# Patient Record
Sex: FEMALE | Race: ASIAN | NOT HISPANIC OR LATINO | URBAN - METROPOLITAN AREA
[De-identification: names, ages, dates, MRNs, and addresses within clinical notes are randomized per-mention and may not be internally consistent; named-entity substitution may affect disease eponyms.]

---

## 2018-10-13 ENCOUNTER — EMERGENCY (EMERGENCY)
Facility: HOSPITAL | Age: 23
LOS: 1 days | Discharge: HOME | End: 2018-10-13
Attending: EMERGENCY MEDICINE | Admitting: EMERGENCY MEDICINE
Payer: COMMERCIAL

## 2018-10-13 VITALS
OXYGEN SATURATION: 98 % | TEMPERATURE: 98 F | SYSTOLIC BLOOD PRESSURE: 123 MMHG | HEART RATE: 86 BPM | DIASTOLIC BLOOD PRESSURE: 76 MMHG | RESPIRATION RATE: 20 BRPM
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
## 2018-10-13 DIAGNOSIS — M79.671 PAIN IN RIGHT FOOT: ICD-10-CM

## 2018-10-13 DIAGNOSIS — S91.331A PUNCTURE WOUND WITHOUT FOREIGN BODY, RIGHT FOOT, INITIAL ENCOUNTER: ICD-10-CM

## 2018-10-13 PROCEDURE — 73630 X-RAY EXAM OF FOOT: CPT | Mod: 26,RT

## 2018-10-13 PROCEDURE — 99284 EMERGENCY DEPT VISIT MOD MDM: CPT | Mod: 25

## 2018-10-13 PROCEDURE — 99053 MED SERV 10PM-8AM 24 HR FAC: CPT

## 2018-10-13 RX ORDER — CEPHALEXIN 500 MG
500 CAPSULE ORAL ONCE
Qty: 0 | Refills: 0 | Status: COMPLETED | OUTPATIENT
Start: 2018-10-13 | End: 2018-10-13

## 2018-10-13 RX ADMIN — Medication 500 MILLIGRAM(S): at 08:39

## 2018-10-13 NOTE — ED PROVIDER NOTE - DIAGNOSTIC INTERPRETATION
Interpreted by MD  foot_ x-ray, 3_ views  No fracture, no dislocation (joint spaces grossly normal), no Foreign Body noted, soft tissue normal. unable to place wet read on order due to radiology dept canceling original order.

## 2018-10-13 NOTE — ED ADULT NURSE NOTE - NSIMPLEMENTINTERV_GEN_ALL_ED
Implemented All Universal Safety Interventions:  [place] to call system. Call bell, personal items and telephone within reach. Instruct patient to call for assistance. Room bathroom lighting operational. Non-slip footwear when patient is off stretcher. Physically safe environment: no spills, clutter or unnecessary equipment. Stretcher in lowest position, wheels locked, appropriate side rails in place.

## 2018-10-13 NOTE — ED PROVIDER NOTE - OBJECTIVE STATEMENT
24 yo female with right foot laceration after stepping on piece of broken glass at a bar. last tetanus within 5 years. LMP 10 days ago.

## 2018-10-13 NOTE — ED PROVIDER NOTE - NSFOLLOWUPINSTRUCTIONS_ED_ALL_ED_FT
keep dressing on for 12 hours then soak off in warm water.  apply neosporin oitnment 3 times daily for 2 days.

## 2018-10-13 NOTE — ED ADULT TRIAGE NOTE - CHIEF COMPLAINT QUOTE
Patient states she stepped on a piece of glass around 0200 this morning, complaining of bleeding and pain to area on her right foot; bleeding has now subsided; up to date on tetanus shot

## 2022-11-02 ENCOUNTER — OFFICE (OUTPATIENT)
Dept: URBAN - METROPOLITAN AREA CLINIC 92 | Facility: CLINIC | Age: 27
Setting detail: OPHTHALMOLOGY
End: 2022-11-02
Payer: COMMERCIAL

## 2022-11-02 DIAGNOSIS — H10.45: ICD-10-CM

## 2022-11-02 DIAGNOSIS — H16.223: ICD-10-CM

## 2022-11-02 PROCEDURE — 99213 OFFICE O/P EST LOW 20 MIN: CPT | Performed by: OPHTHALMOLOGY

## 2022-11-02 ASSESSMENT — REFRACTION_CURRENTRX
OD_SPHERE: -4.00
OD_CYLINDER: +0.25
OS_CYLINDER: +0.25
OD_OVR_VA: 20/
OS_SPHERE: -3.50
OD_AXIS: 083
OS_AXIS: 081
OS_OVR_VA: 20/

## 2022-11-02 ASSESSMENT — REFRACTION_AUTOREFRACTION
OD_CYLINDER: +0.25
OD_SPHERE: -1.00
OS_SPHERE: -0.75
OS_AXIS: 106
OD_AXIS: 121
OS_CYLINDER: +0.50

## 2022-11-02 ASSESSMENT — KERATOMETRY
OS_AXISANGLE_DEGREES: 097
OD_K1POWER_DIOPTERS: 39.75
METHOD_AUTO_MANUAL: AUTO
OS_K1POWER_DIOPTERS: 40.25
OS_K2POWER_DIOPTERS: 41.75
OD_AXISANGLE_DEGREES: 082
OD_K2POWER_DIOPTERS: 40.75

## 2022-11-02 ASSESSMENT — LID EXAM ASSESSMENTS: OD_TRICHIASIS: T

## 2022-11-02 ASSESSMENT — SPHEQUIV_DERIVED
OD_SPHEQUIV: -0.875
OS_SPHEQUIV: -0.5

## 2022-11-02 ASSESSMENT — TEAR BREAK UP TIME (TBUT)
OD_TBUT: T
OS_TBUT: T

## 2022-11-02 ASSESSMENT — CONFRONTATIONAL VISUAL FIELD TEST (CVF)
OS_FINDINGS: FULL
OD_FINDINGS: FULL

## 2022-11-02 ASSESSMENT — AXIALLENGTH_DERIVED
OD_AL: 25.2333
OS_AL: 24.7601

## 2022-11-02 ASSESSMENT — VISUAL ACUITY
OS_BCVA: 20/20
OD_BCVA: 20/20-

## 2022-11-02 ASSESSMENT — CORNEAL TRAUMA - ABRASION: OS_ABRASION: ABSENT

## 2022-11-22 ENCOUNTER — OFFICE (OUTPATIENT)
Dept: URBAN - METROPOLITAN AREA CLINIC 28 | Facility: CLINIC | Age: 27
Setting detail: OPHTHALMOLOGY
End: 2022-11-22
Payer: COMMERCIAL

## 2022-11-22 DIAGNOSIS — H10.433: ICD-10-CM

## 2022-11-22 DIAGNOSIS — H16.223: ICD-10-CM

## 2022-11-22 PROCEDURE — 99213 OFFICE O/P EST LOW 20 MIN: CPT | Performed by: OPHTHALMOLOGY

## 2022-11-22 ASSESSMENT — TONOMETRY
OD_IOP_MMHG: 17
OS_IOP_MMHG: 17

## 2022-11-22 ASSESSMENT — TEAR BREAK UP TIME (TBUT)
OS_TBUT: T
OD_TBUT: T

## 2022-11-30 PROBLEM — H10.433 ALLERGIC CONJUNCTIVITIS; BOTH EYES: Status: ACTIVE | Noted: 2022-11-22

## 2022-11-30 ASSESSMENT — REFRACTION_AUTOREFRACTION
OS_CYLINDER: +0.50
OS_SPHERE: -0.75
OD_CYLINDER: +0.25
OD_AXIS: 121
OS_AXIS: 106
OD_SPHERE: -1.00

## 2022-11-30 ASSESSMENT — REFRACTION_CURRENTRX
OS_AXIS: 081
OS_SPHERE: -3.50
OD_AXIS: 083
OS_CYLINDER: +0.25
OD_SPHERE: -4.00
OD_CYLINDER: +0.25
OD_OVR_VA: 20/
OS_OVR_VA: 20/

## 2022-11-30 ASSESSMENT — VISUAL ACUITY
OD_BCVA: 20/20
OS_BCVA: 20/20

## 2022-11-30 ASSESSMENT — SPHEQUIV_DERIVED
OS_SPHEQUIV: -0.5
OD_SPHEQUIV: -0.875

## 2023-06-21 ENCOUNTER — OFFICE (OUTPATIENT)
Dept: URBAN - METROPOLITAN AREA CLINIC 92 | Facility: CLINIC | Age: 28
Setting detail: OPHTHALMOLOGY
End: 2023-06-21
Payer: COMMERCIAL

## 2023-06-21 DIAGNOSIS — H10.433: ICD-10-CM

## 2023-06-21 DIAGNOSIS — H16.223: ICD-10-CM

## 2023-06-21 PROCEDURE — 92012 INTRM OPH EXAM EST PATIENT: CPT | Performed by: OPHTHALMOLOGY

## 2023-06-21 ASSESSMENT — KERATOMETRY
OS_K1POWER_DIOPTERS: 40.25
OD_K1POWER_DIOPTERS: 39.75
OS_AXISANGLE_DEGREES: 098
OD_AXISANGLE_DEGREES: 081
METHOD_AUTO_MANUAL: AUTO
OD_K2POWER_DIOPTERS: 40.75
OS_K2POWER_DIOPTERS: 41.50

## 2023-06-21 ASSESSMENT — REFRACTION_CURRENTRX
OD_SPHERE: -4.00
OS_CYLINDER: +0.25
OS_AXIS: 081
OS_OVR_VA: 20/
OD_OVR_VA: 20/
OS_SPHERE: -3.50
OD_AXIS: 083
OD_CYLINDER: +0.25

## 2023-06-21 ASSESSMENT — AXIALLENGTH_DERIVED
OD_AL: 25.1224
OS_AL: 24.7035

## 2023-06-21 ASSESSMENT — REFRACTION_AUTOREFRACTION
OS_SPHERE: -0.50
OS_CYLINDER: +0.50
OD_SPHERE: -0.75
OS_AXIS: 106
OD_CYLINDER: +0.25
OD_AXIS: 143

## 2023-06-21 ASSESSMENT — VISUAL ACUITY
OS_BCVA: 20/20
OD_BCVA: 20/20

## 2023-06-21 ASSESSMENT — TONOMETRY
OS_IOP_MMHG: 10
OD_IOP_MMHG: 10

## 2023-06-21 ASSESSMENT — TEAR BREAK UP TIME (TBUT)
OS_TBUT: T
OD_TBUT: T

## 2023-06-21 ASSESSMENT — SPHEQUIV_DERIVED
OS_SPHEQUIV: -0.25
OD_SPHEQUIV: -0.625

## 2023-08-17 ENCOUNTER — OFFICE (OUTPATIENT)
Dept: URBAN - METROPOLITAN AREA CLINIC 28 | Facility: CLINIC | Age: 28
Setting detail: OPHTHALMOLOGY
End: 2023-08-17
Payer: COMMERCIAL

## 2023-08-17 DIAGNOSIS — H10.45: ICD-10-CM

## 2023-08-17 DIAGNOSIS — H16.223: ICD-10-CM

## 2023-08-17 PROCEDURE — 99213 OFFICE O/P EST LOW 20 MIN: CPT | Performed by: OPHTHALMOLOGY

## 2023-08-17 ASSESSMENT — TEAR BREAK UP TIME (TBUT)
OS_TBUT: T
OD_TBUT: T

## 2023-08-18 PROBLEM — H10.45 ALLERGIC CONJUNCTIVITIS ; LEFT EYE: Status: ACTIVE | Noted: 2023-08-17

## 2023-08-18 ASSESSMENT — REFRACTION_CURRENTRX
OD_AXIS: 083
OD_CYLINDER: +0.25
OS_SPHERE: -3.50
OS_AXIS: 081
OD_SPHERE: -4.00
OS_OVR_VA: 20/
OD_OVR_VA: 20/
OS_CYLINDER: +0.25

## 2023-08-18 ASSESSMENT — SPHEQUIV_DERIVED
OD_SPHEQUIV: -0.75
OS_SPHEQUIV: 0

## 2023-08-18 ASSESSMENT — AXIALLENGTH_DERIVED
OS_AL: 24.6472
OD_AL: 25.1777

## 2023-08-18 ASSESSMENT — VISUAL ACUITY
OD_BCVA: 20/20
OS_BCVA: 20/20

## 2023-08-18 ASSESSMENT — REFRACTION_AUTOREFRACTION
OD_CYLINDER: 0.00
OS_AXIS: 6
OD_AXIS: 0
OD_SPHERE: -0.75
OS_CYLINDER: +0.50
OS_SPHERE: -0.25

## 2023-08-18 ASSESSMENT — KERATOMETRY
OS_K1POWER_DIOPTERS: 40.00
OD_K1POWER_DIOPTERS: 39.75
OD_AXISANGLE_DEGREES: 083
METHOD_AUTO_MANUAL: AUTO
OS_AXISANGLE_DEGREES: 097
OD_K2POWER_DIOPTERS: 40.75
OS_K2POWER_DIOPTERS: 41.50

## 2023-10-25 ENCOUNTER — OFFICE (OUTPATIENT)
Dept: URBAN - METROPOLITAN AREA CLINIC 92 | Facility: CLINIC | Age: 28
Setting detail: OPHTHALMOLOGY
End: 2023-10-25
Payer: COMMERCIAL

## 2023-10-25 DIAGNOSIS — H16.223: ICD-10-CM

## 2023-10-25 DIAGNOSIS — H10.45: ICD-10-CM

## 2023-10-25 PROCEDURE — 99213 OFFICE O/P EST LOW 20 MIN: CPT | Performed by: OPHTHALMOLOGY

## 2023-10-25 ASSESSMENT — REFRACTION_CURRENTRX
OD_AXIS: 083
OD_SPHERE: -4.00
OD_OVR_VA: 20/
OS_OVR_VA: 20/
OS_AXIS: 081
OD_CYLINDER: +0.25
OS_SPHERE: -3.50
OS_CYLINDER: +0.25

## 2023-10-25 ASSESSMENT — KERATOMETRY
OS_K1POWER_DIOPTERS: 40.25
OD_K2POWER_DIOPTERS: 40.75
OS_AXISANGLE_DEGREES: 100
OS_K2POWER_DIOPTERS: 42.00
OD_AXISANGLE_DEGREES: 86
OD_K1POWER_DIOPTERS: 39.75
METHOD_AUTO_MANUAL: AUTO

## 2023-10-25 ASSESSMENT — VISUAL ACUITY
OD_BCVA: 20/20
OS_BCVA: 20/20

## 2023-10-25 ASSESSMENT — REFRACTION_AUTOREFRACTION
OS_AXIS: 107
OD_AXIS: 96
OD_CYLINDER: +0.50
OS_SPHERE: -0.75
OD_SPHERE: -0.75
OS_CYLINDER: +0.50

## 2023-10-25 ASSESSMENT — SPHEQUIV_DERIVED
OD_SPHEQUIV: -0.5
OS_SPHEQUIV: -0.5

## 2023-10-25 ASSESSMENT — TEAR BREAK UP TIME (TBUT)
OS_TBUT: T
OD_TBUT: T

## 2023-10-25 ASSESSMENT — TONOMETRY
OD_IOP_MMHG: 10
OS_IOP_MMHG: 11

## 2023-10-25 ASSESSMENT — AXIALLENGTH_DERIVED
OS_AL: 24.7096
OD_AL: 25.0673

## 2024-04-17 ENCOUNTER — OFFICE (OUTPATIENT)
Dept: URBAN - METROPOLITAN AREA CLINIC 92 | Facility: CLINIC | Age: 29
Setting detail: OPHTHALMOLOGY
End: 2024-04-17
Payer: COMMERCIAL

## 2024-04-17 DIAGNOSIS — H16.223: ICD-10-CM

## 2024-04-17 DIAGNOSIS — H10.45: ICD-10-CM

## 2024-04-17 PROCEDURE — 99213 OFFICE O/P EST LOW 20 MIN: CPT | Performed by: OPHTHALMOLOGY

## 2024-08-07 ENCOUNTER — OFFICE (OUTPATIENT)
Dept: URBAN - METROPOLITAN AREA CLINIC 92 | Facility: CLINIC | Age: 29
Setting detail: OPHTHALMOLOGY
End: 2024-08-07
Payer: COMMERCIAL

## 2024-08-07 DIAGNOSIS — H10.45: ICD-10-CM

## 2024-08-07 DIAGNOSIS — H16.223: ICD-10-CM

## 2024-08-07 PROCEDURE — 99213 OFFICE O/P EST LOW 20 MIN: CPT | Performed by: OPHTHALMOLOGY

## 2024-12-31 ENCOUNTER — OFFICE (OUTPATIENT)
Dept: URBAN - METROPOLITAN AREA CLINIC 92 | Facility: CLINIC | Age: 29
Setting detail: OPHTHALMOLOGY
End: 2024-12-31
Payer: COMMERCIAL

## 2024-12-31 DIAGNOSIS — H10.45: ICD-10-CM

## 2024-12-31 DIAGNOSIS — H16.223: ICD-10-CM

## 2024-12-31 PROCEDURE — 92012 INTRM OPH EXAM EST PATIENT: CPT | Performed by: OPHTHALMOLOGY

## 2024-12-31 ASSESSMENT — REFRACTION_CURRENTRX
OS_OVR_VA: 20/
OD_AXIS: 083
OS_SPHERE: -3.50
OD_OVR_VA: 20/
OS_AXIS: 081
OD_CYLINDER: +0.25
OS_CYLINDER: +0.25
OD_SPHERE: -4.00

## 2024-12-31 ASSESSMENT — VISUAL ACUITY
OD_BCVA: 20/20-2
OS_BCVA: 20/20

## 2024-12-31 ASSESSMENT — TONOMETRY
OS_IOP_MMHG: 12
OD_IOP_MMHG: 10

## 2025-05-21 ENCOUNTER — EMERGENCY (EMERGENCY)
Age: 30
LOS: 1 days | End: 2025-05-21
Attending: EMERGENCY MEDICINE | Admitting: EMERGENCY MEDICINE
Payer: COMMERCIAL

## 2025-05-21 VITALS
HEIGHT: 65 IN | RESPIRATION RATE: 18 BRPM | TEMPERATURE: 98 F | SYSTOLIC BLOOD PRESSURE: 130 MMHG | WEIGHT: 134.92 LBS | OXYGEN SATURATION: 98 % | HEART RATE: 76 BPM | DIASTOLIC BLOOD PRESSURE: 79 MMHG

## 2025-05-21 PROCEDURE — 99284 EMERGENCY DEPT VISIT MOD MDM: CPT

## 2025-05-21 PROCEDURE — 93971 EXTREMITY STUDY: CPT | Mod: 26,LT

## 2025-05-23 DIAGNOSIS — M79.662 PAIN IN LEFT LOWER LEG: ICD-10-CM

## 2025-05-23 DIAGNOSIS — I80.02 PHLEBITIS AND THROMBOPHLEBITIS OF SUPERFICIAL VESSELS OF LEFT LOWER EXTREMITY: ICD-10-CM
